# Patient Record
Sex: MALE | Race: WHITE | Employment: FULL TIME | URBAN - METROPOLITAN AREA
[De-identification: names, ages, dates, MRNs, and addresses within clinical notes are randomized per-mention and may not be internally consistent; named-entity substitution may affect disease eponyms.]

---

## 2018-05-10 ENCOUNTER — OFFICE VISIT (OUTPATIENT)
Dept: FAMILY MEDICINE CLINIC | Age: 43
End: 2018-05-10

## 2018-05-10 VITALS
WEIGHT: 160.2 LBS | SYSTOLIC BLOOD PRESSURE: 139 MMHG | BODY MASS INDEX: 22.94 KG/M2 | DIASTOLIC BLOOD PRESSURE: 93 MMHG | TEMPERATURE: 96.9 F | HEART RATE: 80 BPM | OXYGEN SATURATION: 100 % | RESPIRATION RATE: 18 BRPM | HEIGHT: 70 IN

## 2018-05-10 DIAGNOSIS — Z00.00 ROUTINE GENERAL MEDICAL EXAMINATION AT A HEALTH CARE FACILITY: Primary | ICD-10-CM

## 2018-05-10 DIAGNOSIS — Z12.5 PROSTATE CANCER SCREENING: ICD-10-CM

## 2018-05-10 DIAGNOSIS — Z13.220 SCREENING, LIPID: ICD-10-CM

## 2018-05-10 DIAGNOSIS — Z80.42 FAMILY HISTORY OF PROSTATE CANCER IN FATHER: ICD-10-CM

## 2018-05-10 NOTE — MR AVS SNAPSHOT
[address] [facility] 
[phone] Patient: Josias Hernandez MRN: [id number] AWR:[id number] Visit Information Date & Time Provider Department Dept. Phone Encounter #  
 5/10/2018 10:00 AM Joselito Starkey  [facility] [phone] [id number] Follow-up Instructions Return in about 1 year (around 5/10/2019) for Complete Physical.  
  
Upcoming Health Maintenance Date Due Pneumococcal 19-64 Medium Risk (1 of 1 - PPSV23) 9/19/1994 Influenza Age 5 to Adult 8/1/2018 DTaP/Tdap/Td series (2 - Td) 3/31/2019 Allergies as of 5/10/2018  Review Complete On: 5/10/2018 By: Bean Vitale LPN Severity Noted Reaction Type Reactions Nuts [Tree Nut]  03/18/2014    Swelling Other Medication  09/20/2010    Other (comments) Cashews and banana   Tongue tingles Current Immunizations  Reviewed on 3/13/2012 Name Date TDAP Vaccine 3/31/2009 Not reviewed this visit You Were Diagnosed With   
  
 Codes Comments Routine general medical examination at a health care facility    -  Primary ICD-10-CM: Z00.00 ICD-9-CM: V70.0 Screening, lipid     ICD-10-CM: U46.531 ICD-9-CM: V77.91 Prostate cancer screening     ICD-10-CM: Z12.5 ICD-9-CM: V76.44 Family history of prostate cancer in father     ICD-10-CM: Z80.41 
ICD-9-CM: V16.42 Vitals BP Pulse Temp Resp Height(growth percentile) Weight(growth percentile) (!) 139/93 (BP 1 Location: Left arm, BP Patient Position: Sitting) 80 96.9 °F (36.1 °C) (Oral) 18 5' 10\" (1.778 m) 160 lb 3.2 oz (72.7 kg) SpO2 BMI Smoking Status 100% 22.99 kg/m2 Never Smoker Vitals History BMI and BSA Data Body Mass Index Body Surface Area  
 22.99 kg/m 2 1.89 m 2 Preferred Pharmacy Pharmacy Name Phone  [id number] [phone] Your Updated Medication List  
  
Notice  As of 5/10/2018 10:33 AM  
 You have not been prescribed any medications. Follow-up Instructions Return in about 1 year (around 5/10/2019) for Complete Physical.  
  
  
Patient Instructions Well Visit, Ages 25 to 48: Care Instructions Your Care Instructions Physical exams can help you stay healthy. Your doctor has checked your overall health and may have suggested ways to take good care of yourself. He or she also may have recommended tests. At home, you can help prevent illness with healthy eating, regular exercise, and other steps. Follow-up care is a key part of your treatment and safety. Be sure to make and go to all appointments, and call your doctor if you are having problems. It's also a good idea to know your test results and keep a list of the medicines you take. How can you care for yourself at home? · Reach and stay at a healthy weight. This will lower your risk for many problems, such as obesity, diabetes, heart disease, and high blood pressure. · Get at least 30 minutes of physical activity on most days of the week. Walking is a good choice. You also may want to do other activities, such as running, swimming, cycling, or playing tennis or team sports. Discuss any changes in your exercise program with your doctor. · Do not smoke or allow others to smoke around you. If you need help quitting, talk to your doctor about stop-smoking programs and medicines. These can increase your chances of quitting for good. · Talk to your doctor about whether you have any risk factors for sexually transmitted infections (STIs). Having one sex partner (who does not have STIs and does not have sex with anyone else) is a good way to avoid these infections. · Use birth control if you do not want to have children at this time. Talk with your doctor about the choices available and what might be best for you. · Protect your skin from too much sun. When you're outdoors from 10 a.m. to 4 p.m., stay in the shade or cover up with clothing and a hat with a wide brim. Wear sunglasses that block UV rays. Even when it's cloudy, put broad-spectrum sunscreen (SPF 30 or higher) on any exposed skin. · See a dentist one or two times a year for checkups and to have your teeth cleaned. · Wear a seat belt in the car. · Drink alcohol in moderation, if at all. That means no more than 2 drinks a day for men and 1 drink a day for women. Follow your doctor's advice about when to have certain tests. These tests can spot problems early. For everyone · Cholesterol. Have the fat (cholesterol) in your blood tested after age 21. Your doctor will tell you how often to have this done based on your age, family history, or other things that can increase your risk for heart disease. · Blood pressure. Have your blood pressure checked during a routine doctor visit. Your doctor will tell you how often to check your blood pressure based on your age, your blood pressure results, and other factors. · Vision. Talk with your doctor about how often to have a glaucoma test. 
· Diabetes. Ask your doctor whether you should have tests for diabetes. · Colon cancer. Have a test for colon cancer at age 48. You may have one of several tests. If you are younger than 48, you may need a test earlier if you have any risk factors. Risk factors include whether you already had a precancerous polyp removed from your colon or whether your parent, brother, sister, or child has had colon cancer. For women · Breast exam and mammogram. Talk to your doctor about when you should have a clinical breast exam and a mammogram. Medical experts differ on whether and how often women under 50 should have these tests. Your doctor can help you decide what is right for you. · Pap test and pelvic exam. Begin Pap tests at age 24.  A Pap test is the best way to find cervical cancer. The test often is part of a pelvic exam. Ask how often to have this test. 
· Tests for sexually transmitted infections (STIs). Ask whether you should have tests for STIs. You may be at risk if you have sex with more than one person, especially if your partners do not wear condoms. For men · Tests for sexually transmitted infections (STIs). Ask whether you should have tests for STIs. You may be at risk if you have sex with more than one person, especially if you do not wear a condom. · Testicular cancer exam. Ask your doctor whether you should check your testicles regularly. · Prostate exam. Talk to your doctor about whether you should have a blood test (called a PSA test) for prostate cancer. Experts differ on whether and when men should have this test. Some experts suggest it if you are older than 39 and are -American or have a father or brother who got prostate cancer when he was younger than 72. When should you call for help? Watch closely for changes in your health, and be sure to contact your doctor if you have any problems or symptoms that concern you. Where can you learn more? Go to http://nirmal-flora.info/. Enter P072 in the search box to learn more about \"Well Visit, Ages 25 to 48: Care Instructions. \" Current as of: May 12, 2017 Content Version: 11.4 © 5156-6661 Healthwise[employer]orporated. Care instructions adapted under license by [employer] (which disclaims liability or warranty for this information). If you have questions about a medical condition or this instruction, always ask your healthcare professional. [address] any warranty or liability for your use of this information. Introducing [facility]! Leena Newsome introduces [employer] patient portal. Now you can access parts of your medical record, email your doctor's office, and request medication refills online. 1. In your internet browser, go to [email or web address] 2. Click on the First Time User? Click Here link in the Sign In box. You will see the New Member Sign Up page. 3. Enter your Viepage Access Code exactly as it appears below. You will not need to use this code after youve completed the sign-up process. If you do not sign up before the expiration date, you must request a new code. · Viepage Access Code: [email or web address] Expires: 8/8/2018  9:26 AM 
 
4. Enter the last four digits of your Social Security Number (xxxx) and Date of Birth (mm/dd/yyyy) as indicated and click Submit. You will be taken to the next sign-up page. 5. Create a frestylt ID. This will be your Viepage login ID and cannot be changed, so think of one that is secure and easy to remember. 6. Create a Viepage password. You can change your password at any time. 7. Enter your Password Reset Question and Answer. This can be used at a later time if you forget your password. 8. Enter your e-mail address. You will receive e-mail notification when new information is available in [address] 9. Click Sign Up. You can now view and download portions of your medical record. 10. Click the Download Summary menu link to download a portable copy of your medical information. If you have questions, please visit the Frequently Asked Questions section of the Viepage website. Remember, Viepage is NOT to be used for urgent needs. For medical emergencies, dial 911. Now available from your iPhone and Android! Please provide this summary of care documentation to your next provider. Your primary care clinician is listed as Kaylene Koyanagi. If you have any questions after today's visit, please call [phone].

## 2018-05-10 NOTE — PROGRESS NOTES
Assessment/Plan:     Diagnoses and all orders for this visit:    1. Routine general medical examination at a health care facility  -     CBC WITH AUTOMATED DIFF  -     METABOLIC PANEL, COMPREHENSIVE  He is up-to-date on routine care. Follow-up in 1 year sooner as needed. Fasting lipids for baseline. 2. Screening, lipid  -     LIPID PANEL    3. Prostate cancer screening  -     PSA W/ REFLX FREE PSA  If normal repeat at age 39. Patient requested baseline PSA at this time. 4. Family history of prostate cancer in father  -     PSA W/ REFLX FREE PSA               Follow-up Disposition:  Return in about 1 year (around 5/10/2019) for Complete Physical.    Discussed expected course/resolution/complications of diagnosis in detail with patient.    Medication risks/benefits/costs/interactions/alternatives discussed with patient.    Pt was given after visit summary which includes diagnoses, current medications & vitals. Pt expressed understanding with the diagnosis and plan    HPI:   Vinicio Andersen is a 43 y.o. male who presents for annual exam and to establish care. Reports he is generally in good health. Is without concerns today. There is a family history of prostate cancer which he would like evaluated at this time. Denies nocturia. Is generally active in the form of aerobic activity. Reports diet is generally healthy.        Allergies   Allergen Reactions    Nuts [Tree Nut] Swelling    Other Medication Other (comments)     Cashews and banana   Tongue tingles      Patient Active Problem List   Diagnosis Code    Asthma, exercise induced J45.990    Allergic rhinitis J30.9    Family history of prostate cancer in father Z80.41     Past Medical History:   Diagnosis Date    Acne vulgaris     acne treated with Accutane    Asthma     exercise induced    Hemorrhoids     Nosebleed     frequent      Past Surgical History:   Procedure Laterality Date    HX KNEE ARTHROSCOPY  12/11    right partial menisctomy, cyclops lesion    HX KNEE ARTHROSCOPY  7/11    right ACL, meniscus repair      No LMP for male patient. 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     Social History Main Topics    Smoking status: Never Smoker    Smokeless tobacco: Never Used    Alcohol use 4.0 oz/week     8 Cans of beer per week    Drug use: No    Sexual activity: Yes     Partners: Female     Birth control/ protection: None     Other Topics Concern    Exercise Not Asked      golfs once a week, weights 3 times per week. Social History Narrative        ROS:     Review of Systems   Constitutional: Negative for fever, malaise/fatigue and weight loss. HENT: Negative for hearing loss. Eyes: Negative for blurred vision and pain. Respiratory: Negative for cough and shortness of breath. Cardiovascular: Negative for chest pain, palpitations and leg swelling. Gastrointestinal: Negative for abdominal pain, blood in stool, constipation, diarrhea and melena. Genitourinary: Negative for dysuria and hematuria. Musculoskeletal: Negative for joint pain. Skin: Negative for rash. Neurological: Negative for headaches. Psychiatric/Behavioral: Negative for depression. The patient is not nervous/anxious and does not have insomnia.         Physical Exam:     Visit Vitals    BP (!) 139/93 (BP 1 Location: Left arm, BP Patient Position: Sitting)    Pulse 80    Temp 96.9 °F (36.1 °C) (Oral)    Resp 18    Ht 5' 10\" (1.778 m)    Wt 160 lb 3.2 oz (72.7 kg)    SpO2 100%    BMI 22.99 kg/m2        Vital signs and nurse documentation reviewed. Physical Exam   Constitutional: No distress. HENT:   Right Ear: No drainage. Tympanic membrane is not injected, not erythematous and not bulging. No middle ear effusion. Left Ear: No drainage. Tympanic membrane is not injected, not erythematous and not bulging. No middle ear effusion. Nose: No mucosal edema or rhinorrhea. Mouth/Throat: Normal dentition. No oropharyngeal exudate, posterior oropharyngeal erythema or tonsillar abscesses. Eyes: Pupils are equal, round, and reactive to light. Neck: No JVD present. Carotid bruit is not present. No tracheal deviation present. No thyroid mass and no thyromegaly present. Cardiovascular: S1 normal and S2 normal.  Exam reveals no gallop and no friction rub. No murmur heard. Pulses:       Dorsalis pedis pulses are 2+ on the right side, and 2+ on the left side. Posterior tibial pulses are 2+ on the right side, and 2+ on the left side. Pulmonary/Chest: Breath sounds normal. He has no wheezes. Abdominal: Soft. Bowel sounds are normal. He exhibits no distension and no mass. There is no hepatosplenomegaly. There is no tenderness. Lymphadenopathy:     He has no cervical adenopathy. He has no axillary adenopathy. Neurological: He has normal motor skills, normal sensation and normal strength. No cranial nerve deficit. Gait normal.   Skin: Skin is warm and dry.    Psychiatric: Mood, memory, affect and judgment normal.

## 2018-05-10 NOTE — PATIENT INSTRUCTIONS

## 2018-05-10 NOTE — PROGRESS NOTES
Chief Complaint   Patient presents with   [facility].    Complete Physical     1. Have you been to the ER, urgent care clinic since your last visit? Hospitalized since your last visit? No    2. Have you seen or consulted any other health care providers outside of the [address] [place] [address] since your last visit? Include any pap smears or colon screening.  No

## 2018-07-19 ENCOUNTER — HOSPITAL ENCOUNTER (OUTPATIENT)
Dept: ULTRASOUND IMAGING | Age: 43
Discharge: HOME | End: 2018-07-19
Attending: PODIATRIST
Payer: COMMERCIAL

## 2018-07-19 DIAGNOSIS — S86.019A ACHILLES TENDON TEAR: ICD-10-CM

## 2018-07-19 PROCEDURE — 76882 US LMTD JT/FCL EVL NVASC XTR: CPT

## 2018-11-30 ENCOUNTER — OFFICE VISIT (OUTPATIENT)
Dept: FAMILY MEDICINE CLINIC | Age: 43
End: 2018-11-30

## 2018-11-30 VITALS
BODY MASS INDEX: 23.91 KG/M2 | OXYGEN SATURATION: 99 % | WEIGHT: 167 LBS | HEART RATE: 86 BPM | HEIGHT: 70 IN | DIASTOLIC BLOOD PRESSURE: 80 MMHG | RESPIRATION RATE: 16 BRPM | TEMPERATURE: 97.2 F | SYSTOLIC BLOOD PRESSURE: 116 MMHG

## 2018-11-30 DIAGNOSIS — J06.9 VIRAL URI WITH COUGH: Primary | ICD-10-CM

## 2018-11-30 NOTE — PROGRESS NOTES
[facility]  Clinic Note  Subjective:      Nino Yung is a 37 y.o. male who presents for an acute visit with the following chief complaints. Chief Complaint   Patient presents with    Nasal Congestion     sinus congestion for 2 days. taking Claritin D and Advil Sinus. pt states he has had his flu shot.  Cough     dry cough    Headache     Upper Respiratory Infection  Patient complains of symptoms of a URI. Symptoms include congestion and cough. Onset of symptoms was 2 days ago, stable since that time. No fever, initially he had body aches, chills, headache, this has all resolved and now he has nasal congestion, non-productive cough, low energy. No chest pain or SOB. He is drinking moderate amounts of fluids. . Evaluation to date: none. Treatment to date: OTC products. Taking Claritin- D, Advil sinus and cold which was helpful. He has presentation in 4 days       Allergies   Allergen Reactions    Nuts [Tree Nut] Swelling    Other Medication Other (comments)     Cashews and banana   Tongue tingles       ROS:   Complete review of systems was reviewed with pertinent information listed in HPI. Review of Systems   Constitutional: Positive for malaise/fatigue. Negative for chills, diaphoresis, fever and weight loss. HENT: Positive for congestion. Negative for ear pain, hearing loss, sinus pain, sore throat and tinnitus. Eyes: Negative for blurred vision. Respiratory: Positive for cough. Negative for sputum production, shortness of breath and wheezing. Cardiovascular: Negative for chest pain and palpitations. Gastrointestinal: Negative for abdominal pain, diarrhea, heartburn, nausea and vomiting. Genitourinary: Negative for dysuria. Musculoskeletal: Negative for myalgias. Skin: Negative for rash. Neurological: Negative for dizziness, weakness and headaches. Psychiatric/Behavioral: The patient does not have insomnia.         Objective:     Visit Vitals  BP 116/80 (BP 1 Location: Left arm, BP Patient Position: Sitting)   Pulse 86   Temp 97.2 °F (36.2 °C) (Oral)   Resp 16   Ht 5' 10\" (1.778 m)   Wt 167 lb (75.8 kg)   SpO2 99%   BMI 23.96 kg/m²       Vitals and Nurse Documentation reviewed. Physical Exam   Constitutional: He is oriented to person, place, and time and well-developed, well-nourished, and in no distress. He does not have a sickly appearance. HENT:   Head: Normocephalic and atraumatic. Right Ear: Hearing, external ear and ear canal normal. Tympanic membrane is not erythematous and not bulging. No middle ear effusion. Left Ear: Hearing, external ear and ear canal normal. Tympanic membrane is not erythematous and not bulging. No middle ear effusion. Nose: Mucosal edema and rhinorrhea present. Right sinus exhibits no maxillary sinus tenderness and no frontal sinus tenderness. Left sinus exhibits no maxillary sinus tenderness and no frontal sinus tenderness. Mouth/Throat: Uvula is midline, oropharynx is clear and moist and mucous membranes are normal. Mucous membranes are not pale, not dry and not cyanotic. No oropharyngeal exudate, posterior oropharyngeal edema, posterior oropharyngeal erythema or tonsillar abscesses. Eyes: Conjunctivae are normal. Pupils are equal, round, and reactive to light. Right conjunctiva is not injected. Left conjunctiva is not injected. Neck: Normal range of motion and phonation normal. Neck supple. No tracheal deviation present. No thyromegaly present. Cardiovascular: Normal rate, regular rhythm, S1 normal, S2 normal, normal heart sounds and intact distal pulses. Exam reveals no gallop and no friction rub. No murmur heard. Pulmonary/Chest: Effort normal and breath sounds normal. No respiratory distress. He has no decreased breath sounds. He has no wheezes. He has no rhonchi. He has no rales. He exhibits no tenderness. Musculoskeletal: He exhibits no edema. Lymphadenopathy:        Head (right side):  No submental, no submandibular, no tonsillar, no preauricular, no posterior auricular and no occipital adenopathy present. Head (left side): No submental, no submandibular, no tonsillar, no preauricular, no posterior auricular and no occipital adenopathy present. He has no cervical adenopathy. Neurological: He is alert and oriented to person, place, and time. Gait normal.   Skin: Skin is warm, dry and intact. No rash noted. He is not diaphoretic. No cyanosis. No pallor. Nails show no clubbing. Psychiatric: Mood and affect normal.   Nursing note and vitals reviewed. Assessment/Plan:     Diagnoses and all orders for this visit:    1. Viral URI with cough: Day 3 of URI symptoms without signs or risk factors for bacterial infections. Reassurance provided. Continue conservative therapy, RTC if symptoms worsen or do not resolve in 3-4 days. Follow-up Disposition:  Return if symptoms worsen or fail to improve.     Discussed expected course/resolution/complications of diagnosis in detail with patient.    Medication risks/benefits/costs/interactions/alternatives discussed with patient.    Pt was given an after visit summary which includes diagnoses, current medications & vitals.  Pt expressed understanding with the diagnosis and plan

## 2018-11-30 NOTE — PATIENT INSTRUCTIONS
Dextromethorphan - robitussin - cough   Guaifenesin - Mucinex - expectorant/congestion  Advil or tylenol for fever, pain. Increase hydration  Humidification at night can be helpful. Nasal saline rinses are helpful for congestion and post nasal drip. Return or call if symptoms do not improve in 3-4 days        Upper Respiratory Infection (Cold): Care Instructions  Your Care Instructions    An upper respiratory infection, or URI, is an infection of the nose, sinuses, or throat. URIs are spread by coughs, sneezes, and direct contact. The common cold is the most frequent kind of URI. The flu and sinus infections are other kinds of URIs. Almost all URIs are caused by viruses. Antibiotics won't cure them. But you can treat most infections with home care. This may include drinking lots of fluids and taking over-the-counter pain medicine. You will probably feel better in 4 to 10 days. The doctor has checked you carefully, but problems can develop later. If you notice any problems or new symptoms, get medical treatment right away. Follow-up care is a key part of your treatment and safety. Be sure to make and go to all appointments, and call your doctor if you are having problems. It's also a good idea to know your test results and keep a list of the medicines you take. How can you care for yourself at home? · To prevent dehydration, drink plenty of fluids, enough so that your urine is light yellow or clear like water. Choose water and other caffeine-free clear liquids until you feel better. If you have kidney, heart, or liver disease and have to limit fluids, talk with your doctor before you increase the amount of fluids you drink. · Take an over-the-counter pain medicine, such as acetaminophen (Tylenol), ibuprofen (Advil, Motrin), or naproxen (Aleve). Read and follow all instructions on the label. · Before you use cough and cold medicines, check the label.  These medicines may not be safe for young children or for people with certain health problems. · Be careful when taking over-the-counter cold or flu medicines and Tylenol at the same time. Many of these medicines have acetaminophen, which is Tylenol. Read the labels to make sure that you are not taking more than the recommended dose. Too much acetaminophen (Tylenol) can be harmful. · Get plenty of rest.  · Do not smoke or allow others to smoke around you. If you need help quitting, talk to your doctor about stop-smoking programs and medicines. These can increase your chances of quitting for good. When should you call for help? Call 911 anytime you think you may need emergency care. For example, call if:    · You have severe trouble breathing.    Call your doctor now or seek immediate medical care if:    · You seem to be getting much sicker.     · You have new or worse trouble breathing.     · You have a new or higher fever.     · You have a new rash.    Watch closely for changes in your health, and be sure to contact your doctor if:    · You have a new symptom, such as a sore throat, an earache, or sinus pain.     · You cough more deeply or more often, especially if you notice more mucus or a change in the color of your mucus.     · You do not get better as expected. Where can you learn more? Go to http://nirmal-flora.info/. Enter C740 in the search box to learn more about \"Upper Respiratory Infection (Cold): Care Instructions. \"  Current as of: December 6, 2017  Content Version: 11.8  © 1566-3350 [employer]. Care instructions adapted under license by [employer] (which disclaims liability or warranty for this information). If you have questions about a medical condition or this instruction, always ask your healthcare professional. [place] [address] any warranty or liability for your use of this information.

## 2018-11-30 NOTE — PROGRESS NOTES
Chief Complaint   Patient presents with    Nasal Congestion     sinus congestion for 2 days. taking Claritin D and Advil Sinus. pt states he has had his flu shot.  Cough     dry cough    Headache     \"REVIEWED RECORD IN PREPARATION FOR VISIT AND HAVE OBTAINED THE NECESSARY DOCUMENTATION\"  1. Have you been to the ER, urgent care clinic since your last visit? Hospitalized since your last visit? No    2. Have you seen or consulted any other health care providers outside of the [address] [place] [address] since your last visit? Include any pap smears or colon screening.  No

## 2019-02-21 ENCOUNTER — OFFICE VISIT (OUTPATIENT)
Dept: FAMILY MEDICINE CLINIC | Age: 44
End: 2019-02-21

## 2019-02-21 VITALS
BODY MASS INDEX: 23.82 KG/M2 | DIASTOLIC BLOOD PRESSURE: 82 MMHG | WEIGHT: 166.4 LBS | HEART RATE: 72 BPM | SYSTOLIC BLOOD PRESSURE: 120 MMHG | HEIGHT: 70 IN | TEMPERATURE: 98.1 F | RESPIRATION RATE: 16 BRPM | OXYGEN SATURATION: 97 %

## 2019-02-21 DIAGNOSIS — R68.89 FLU-LIKE SYMPTOMS: ICD-10-CM

## 2019-02-21 DIAGNOSIS — J06.9 VIRAL URI WITH COUGH: Primary | ICD-10-CM

## 2019-02-21 LAB
QUICKVUE INFLUENZA TEST: NEGATIVE
VALID INTERNAL CONTROL?: YES

## 2019-02-21 NOTE — PROGRESS NOTES
HISTORY OF PRESENT ILLNESS  Wiliam Acosta is a 37 y.o. male. HPI  C/o cough, chest congestion and chills x 4 days. Low grade fever to 100.2 initially, now resolved. Taking robitussin and ibuprofen with some temporary sx relief. Past medical history, social history, family history and medications were reviewed and updated. Blood pressure 120/82, pulse 72, temperature 98.1 °F (36.7 °C), temperature source Oral, resp. rate 16, height 5' 10\" (1.778 m), weight 166 lb 6.4 oz (75.5 kg), SpO2 97 %. Review of Systems   Constitutional: Positive for chills, fever and malaise/fatigue. HENT: Negative for sinus pain and sore throat. Respiratory: Positive for cough and sputum production (yellow/green). Negative for shortness of breath and wheezing. Cardiovascular: Negative for chest pain. Neurological: Negative for headaches. All other systems reviewed and are negative. Physical Exam   Constitutional: He appears well-developed and well-nourished. No distress. HENT:   Right Ear: Tympanic membrane and ear canal normal.   Left Ear: Tympanic membrane and ear canal normal.   Nose: Right sinus exhibits no maxillary sinus tenderness and no frontal sinus tenderness. Left sinus exhibits no maxillary sinus tenderness and no frontal sinus tenderness. Mouth/Throat: Posterior oropharyngeal erythema present. No oropharyngeal exudate or posterior oropharyngeal edema. Neck: Neck supple. Cardiovascular: Normal rate, regular rhythm and normal heart sounds. Pulmonary/Chest: Effort normal and breath sounds normal. He has no wheezes. Lymphadenopathy:     He has no cervical adenopathy. Skin: Skin is warm and dry. ASSESSMENT and PLAN  Diagnoses and all orders for this visit:    1. Viral URI with cough  Rest and fluids. Mucinex  DM bid. Nyquil cough at bedtime prn.    2. Flu-like symptoms  -     AMB POC RAPID INFLUENZA TEST  Negative. Follow up prn if sx worsen or FTI.

## 2019-02-21 NOTE — PROGRESS NOTES
Chief Complaint   Patient presents with    Cold Symptoms     Started coughing about 4 days, dark greenish mucous,fever , wheezing and chest congestion,  took some advil and claritin , not helped much. 1. Have you been to the ER, urgent care clinic since your last visit? Hospitalized since your last visit? No    2. Have you seen or consulted any other health care providers outside of the [address] [place] [address] since your last visit? Include any pap smears or colon screening.  No.

## 2019-06-12 ENCOUNTER — OFFICE VISIT (OUTPATIENT)
Dept: FAMILY MEDICINE CLINIC | Age: 44
End: 2019-06-12

## 2019-06-12 VITALS
DIASTOLIC BLOOD PRESSURE: 84 MMHG | OXYGEN SATURATION: 98 % | WEIGHT: 164 LBS | SYSTOLIC BLOOD PRESSURE: 142 MMHG | HEART RATE: 71 BPM | RESPIRATION RATE: 20 BRPM | TEMPERATURE: 97.9 F | HEIGHT: 70 IN | BODY MASS INDEX: 23.48 KG/M2

## 2019-06-12 DIAGNOSIS — J02.9 SORE THROAT: ICD-10-CM

## 2019-06-12 DIAGNOSIS — J02.9 PHARYNGITIS, UNSPECIFIED ETIOLOGY: Primary | ICD-10-CM

## 2019-06-12 LAB
S PYO AG THROAT QL: NEGATIVE
VALID INTERNAL CONTROL?: YES

## 2019-06-12 RX ORDER — AMOXICILLIN 875 MG/1
875 TABLET, FILM COATED ORAL 2 TIMES DAILY
Qty: 20 TAB | Refills: 0 | Status: SHIPPED | OUTPATIENT
Start: 2019-06-12 | End: 2019-06-22

## 2019-06-12 NOTE — PROGRESS NOTES
HPI  Jasper Hi 37 y.o. male  presents to the office today for sore throat. Blood pressure 142/84, pulse 71, temperature 97.9 °F (36.6 °C), temperature source Oral, resp. rate 20, height 5' 10\" (1.778 m), weight 164 lb (74.4 kg), SpO2 98 %. Body mass index is 23.53 kg/m². Chief Complaint   Patient presents with    Sore Throat     sore throat, body aches, dry cough, started this today per patient his daughter was sick and was given medication but doesnt know what they gave her      Sore throat/Pharyngitis: Pt reports that his daughter was sick yesterday and was diagnosed with sinal infection and was prescribed antibiotics. This morning, the pt started experiencing body aches and at 2pm, Pt started experienced chills, dry cough and sore throat. Strep test today in office was negative, but I advised to pt that strep test is usually not 100% accurate. Upon PE, I diagnosed pt with pharyngitis and I will provide pt with Amoxicillin 875 mg/BID. I also provided pt with a list of OTC medications that can be helpful to relieve sx. If sx do not improve or worsen, I advised pt to return to office.      Allergies   Allergen Reactions    Nuts [Tree Nut] Swelling    Other Medication Other (comments)     Cashews and banana   Tongue tingles     Past Medical History:   Diagnosis Date    Acne vulgaris     acne treated with Accutane    Asthma     exercise induced    Hemorrhoids     Nosebleed     frequent     Past Surgical History:   Procedure Laterality Date    HX KNEE ARTHROSCOPY  12/11    right partial menisctomy, cyclops lesion    HX KNEE ARTHROSCOPY  7/11    right ACL, meniscus repair     Family History   Problem Relation Age of Onset    Hypertension Mother     Cancer Father 61        prostate, mets to spine    Cancer Paternal Aunt         breast CA 45s    Diabetes Paternal Grandfather     Hypertension Brother 43    Thyroid Disease Brother 36     Social History     Tobacco Use    Smoking status: Never Smoker    Smokeless tobacco: Never Used   Substance Use Topics    Alcohol use: Yes     Alcohol/week: 4.0 oz     Types: 8 Cans of beer per week        Review of Systems   Constitutional: Negative for chills and fever. HENT: Positive for sinus pain and sore throat. Negative for hearing loss and tinnitus. Eyes: Negative for blurred vision and double vision. Respiratory: Negative for cough and shortness of breath. Cardiovascular: Negative for chest pain and palpitations. Gastrointestinal: Negative for nausea and vomiting. Genitourinary: Negative for dysuria and frequency. Musculoskeletal: Negative for back pain and falls. Skin: Negative for itching and rash. Neurological: Negative for dizziness, loss of consciousness and headaches. Psychiatric/Behavioral: Negative for depression. The patient is not nervous/anxious. Physical Exam   Constitutional: He is oriented to person, place, and time. He appears well-developed and well-nourished. HENT:   Head: Normocephalic and atraumatic. Right Ear: External ear normal.   Left Ear: External ear normal.   Nose: Nose normal.   Mouth/Throat: Oropharynx is clear and moist.   Erythema in back of throat, no tonsillar exudate noted    Eyes: Conjunctivae and EOM are normal.   Neck: Normal range of motion. Neck supple. Notable cervical adenopathy   Cardiovascular: Normal rate, regular rhythm, normal heart sounds and intact distal pulses. Pulmonary/Chest: Effort normal and breath sounds normal.   Abdominal: Soft. Bowel sounds are normal.   Musculoskeletal: Normal range of motion. Neurological: He is alert and oriented to person, place, and time. Skin: Skin is warm and dry. Psychiatric: He has a normal mood and affect. His behavior is normal. Judgment and thought content normal.   Nursing note and vitals reviewed. ASSESSMENT and PLAN  Diagnoses and all orders for this visit:    1.  Pharyngitis, unspecified etiology  Provided pt with prescription for Amoxicillin 875 mg/BID for 10 days. Provided pt with a list of OTC medications to help relieving sx. If sx do not improve or worsen, advised pt to return to office.   -     amoxicillin (AMOXIL) 875 mg tablet; Take 1 Tab by mouth two (2) times a day for 10 days. 2. Sore throat  Strep test negative. -     AMB POC RAPID STREP A    Follow-up and Dispositions    · Return if symptoms worsen or fail to improve, for uri. Medication risks/benefits/costs/interactions/alternatives discussed with patient. Advised patient to call back or return to office if symptoms worsen/change/persist.  If patient cannot reach us or should anything more severe/urgent arise he/she should proceed directly to the nearest emergency department. Discussed expected course/resolution/complications of diagnosis in detail with patient. Patient given a written after visit summary which includes her diagnoses, current medications and vitals. Patient expressed understanding with the diagnosis and plan. Written by casper Nguyen, as dictated by Johanna Marks M.D.    6:15 PM - 6:23 PM    Total time spent with the patient 7 minutes, greater than 50% of time spent counseling patient.

## 2019-06-12 NOTE — PATIENT INSTRUCTIONS

## 2019-06-12 NOTE — PROGRESS NOTES
Chief Complaint   Patient presents with    Sore Throat     sore throat, body aches, dry cough, started this today per patient his daughter was sick and was given medication but doesnt know what they gave her         Spoke to patient Identified pt with two pt identifiers (Name @ )    1. Have you been to the ER, urgent care clinic since your last visit? Hospitalized since your last visit? NO    2. Have you seen or consulted any other health care providers outside of the [address] [place] [address] since your last visit? Include any pap smears or colon screening.      \"REVIEWED RECORD IN PREPARATION FOR VISIT AND HAVE OBTAINED NECESSARY DOCUMENTATION\"

## 2019-06-24 ENCOUNTER — OFFICE VISIT (OUTPATIENT)
Dept: FAMILY MEDICINE CLINIC | Age: 44
End: 2019-06-24

## 2019-06-24 VITALS
BODY MASS INDEX: 23.34 KG/M2 | HEIGHT: 70 IN | HEART RATE: 72 BPM | OXYGEN SATURATION: 98 % | RESPIRATION RATE: 18 BRPM | TEMPERATURE: 98.2 F | DIASTOLIC BLOOD PRESSURE: 75 MMHG | WEIGHT: 163 LBS | SYSTOLIC BLOOD PRESSURE: 118 MMHG

## 2019-06-24 DIAGNOSIS — J06.9 ACUTE URI: ICD-10-CM

## 2019-06-24 DIAGNOSIS — M25.50 POLYARTHRALGIA: ICD-10-CM

## 2019-06-24 DIAGNOSIS — R53.83 FATIGUE, UNSPECIFIED TYPE: Primary | ICD-10-CM

## 2019-06-24 RX ORDER — BENZONATATE 200 MG/1
200 CAPSULE ORAL
Qty: 20 CAP | Refills: 0 | Status: SHIPPED | OUTPATIENT
Start: 2019-06-24 | End: 2019-07-01

## 2019-06-24 RX ORDER — IBUPROFEN 200 MG
TABLET ORAL
COMMUNITY

## 2019-06-24 NOTE — PATIENT INSTRUCTIONS
Fatigue: Care Instructions  Your Care Instructions    Fatigue is a feeling of tiredness, exhaustion, or lack of energy. You may feel fatigue because of too much or not enough activity. It can also come from stress, lack of sleep, boredom, and poor diet. Many medical problems, such as viral infections, can cause fatigue. Emotional problems, especially depression, are often the cause of fatigue. Fatigue is most often a symptom of another problem. Treatment for fatigue depends on the cause. For example, if you have fatigue because you have a certain health problem, treating this problem also treats your fatigue. If depression or anxiety is the cause, treatment may help. Follow-up care is a key part of your treatment and safety. Be sure to make and go to all appointments, and call your doctor if you are having problems. It's also a good idea to know your test results and keep a list of the medicines you take. How can you care for yourself at home? · Get regular exercise. But don't overdo it. Go back and forth between rest and exercise. · Get plenty of rest.  · Eat a healthy diet. Do not skip meals, especially breakfast.  · Reduce your use of caffeine, tobacco, and alcohol. Caffeine is most often found in coffee, tea, cola drinks, and chocolate. · Limit medicines that can cause fatigue. This includes tranquilizers and cold and allergy medicines. When should you call for help? Watch closely for changes in your health, and be sure to contact your doctor if:    · You have new symptoms such as fever or a rash.     · Your fatigue gets worse.     · You have been feeling down, depressed, or hopeless. Or you may have lost interest in things that you usually enjoy.     · You are not getting better as expected. Where can you learn more? Go to http://nirmal-flora.info/. Enter E324 in the search box to learn more about \"Fatigue: Care Instructions. \"  Current as of: September 23, 2018  Content Version: 11.9  © 8880-1257 [employer]. Care instructions adapted under license by [employer] (which disclaims liability or warranty for this information). If you have questions about a medical condition or this instruction, always ask your healthcare professional. [address] any warranty or liability for your use of this information.

## 2019-06-24 NOTE — PROGRESS NOTES
Assessment/Plan:     Diagnoses and all orders for this visit:    1. Fatigue, unspecified type  -     CBC WITH AUTOMATED DIFF  -     METABOLIC PANEL, COMPREHENSIVE  -     XR CHEST PA LAT; Future  -     GIDEON BARR VIRUS AB PANELq  Labs pending. Etiology possibly viral.      2. Acute URI  -     CBC WITH AUTOMATED DIFF  -     METABOLIC PANEL, COMPREHENSIVE  -     XR CHEST PA LAT; Future  -     GIDEON BARR VIRUS AB PANEL  -     benzonatate (TESSALON) 200 mg capsule; Take 1 Cap by mouth three (3) times daily as needed for Cough for up to 7 days. Treatment as above. Await labs and xray. Follow up if no improvement. 3. Polyarthralgia  -     CBC WITH AUTOMATED DIFF  -     METABOLIC PANEL, COMPREHENSIVE  -     XR CHEST PA LAT; Future  -     GIDEON BARR VIRUS AB PANEL        Follow-up and Dispositions    · Return if symptoms worsen or fail to improve. Discussed expected course/resolution/complications of diagnosis in detail with patient. Medication risks/benefits/costs/interactions/alternatives discussed with patient. Pt was given after visit summary which includes diagnoses, current medications & vitals. Pt expressed understanding with the diagnosis and plan          Subjective:      Souleymane Araujo is a 37 y.o. male who presents for had concerns including Cold Symptoms (cough at night x 12 days); Lethargy; and Joint Pain. Upper Respiratory Infection  Patient complains of symptoms of a URI. Symptoms include cough. Onset of symptoms was 12 days ago, unchanged since that time. He also c/o joint pain, extreme fatigue for the past 10 days . He is drinking plenty of fluids. . Evaluation to date: seen previously . Treatment to date: Amoxicillin with some minimal improvement.       Patient Active Problem List   Diagnosis Code    Asthma, exercise induced J45.990    Allergic rhinitis J30.9    Family history of prostate cancer in father Z80.41       Current Outpatient Medications   Medication Sig Dispense Refill    ibuprofen (MOTRIN) 200 mg tablet Take  by mouth.  benzonatate (TESSALON) 200 mg capsule Take 1 Cap by mouth three (3) times daily as needed for Cough for up to 7 days. 20 Cap 0       Allergies   Allergen Reactions    Nuts [Tree Nut] Swelling    Other Medication Other (comments)     Cashews and banana   Tongue tingles       ROS:   Review of Systems   Constitutional: Positive for malaise/fatigue. Negative for chills and fever. HENT: Negative for congestion, ear pain, sinus pain and sore throat. Respiratory: Positive for cough. Negative for sputum production, shortness of breath and wheezing. Cardiovascular: Negative for chest pain. Musculoskeletal: Positive for joint pain. Neurological: Negative for seizures. Endo/Heme/Allergies: Negative for environmental allergies. Objective:     Visit Vitals  /75   Pulse 72   Temp 98.2 °F (36.8 °C) (Oral)   Resp 18   Ht 5' 10\" (1.778 m)   Wt 163 lb (73.9 kg)   SpO2 98%   BMI 23.39 kg/m²       Vitals and Nurse Documentation reviewed. Physical Exam   Constitutional: No distress. HENT:   Right Ear: Tympanic membrane is not erythematous and not bulging. No middle ear effusion. Left Ear: Tympanic membrane is not erythematous and not bulging. No middle ear effusion. Nose: No rhinorrhea. Right sinus exhibits no maxillary sinus tenderness and no frontal sinus tenderness. Left sinus exhibits no maxillary sinus tenderness and no frontal sinus tenderness. Mouth/Throat: No oropharyngeal exudate or posterior oropharyngeal erythema. Eyes: EOM and lids are normal.   Cardiovascular: S1 normal and S2 normal. Exam reveals no gallop and no friction rub. No murmur heard. Pulmonary/Chest: Breath sounds normal. He has no wheezes. Lymphadenopathy:     He has no cervical adenopathy. Skin: Skin is warm and dry.    Psychiatric: Mood and affect normal.

## 2019-06-24 NOTE — PROGRESS NOTES
Chief Complaint   Patient presents with    Cold Symptoms     cough at night x 12 days    Lethargy     1. Have you been to the ER, urgent care clinic since your last visit? Hospitalized since your last visit? No    2. Have you seen or consulted any other health care providers outside of the [address] [place] [address] since your last visit? Include any pap smears or colon screening.  No

## 2019-06-25 LAB
ALBUMIN SERPL-MCNC: 4.9 G/DL (ref 3.5–5.5)
ALBUMIN/GLOB SERPL: 2.5 {RATIO} (ref 1.2–2.2)
ALP SERPL-CCNC: 57 IU/L (ref 39–117)
ALT SERPL-CCNC: 16 IU/L (ref 0–44)
AST SERPL-CCNC: 19 IU/L (ref 0–40)
BASOPHILS # BLD AUTO: 0 X10E3/UL (ref 0–0.2)
BASOPHILS NFR BLD AUTO: 1 %
BILIRUB SERPL-MCNC: 0.4 MG/DL (ref 0–1.2)
BUN SERPL-MCNC: 14 MG/DL (ref 6–24)
BUN/CREAT SERPL: 13 (ref 9–20)
CALCIUM SERPL-MCNC: 9.6 MG/DL (ref 8.7–10.2)
CHLORIDE SERPL-SCNC: 99 MMOL/L (ref 96–106)
CO2 SERPL-SCNC: 28 MMOL/L (ref 20–29)
CREAT SERPL-MCNC: 1.07 MG/DL (ref 0.76–1.27)
EBV EA IGG SER-ACNC: <9 U/ML (ref 0–8.9)
EBV NA IGG SER IA-ACNC: 189 U/ML (ref 0–17.9)
EBV VCA IGG SER IA-ACNC: >600 U/ML (ref 0–17.9)
EBV VCA IGM SER IA-ACNC: <36 U/ML (ref 0–35.9)
EOSINOPHIL # BLD AUTO: 0.3 X10E3/UL (ref 0–0.4)
EOSINOPHIL NFR BLD AUTO: 5 %
ERYTHROCYTE [DISTWIDTH] IN BLOOD BY AUTOMATED COUNT: 13.8 % (ref 12.3–15.4)
GLOBULIN SER CALC-MCNC: 2 G/DL (ref 1.5–4.5)
GLUCOSE SERPL-MCNC: 71 MG/DL (ref 65–99)
HCT VFR BLD AUTO: 46.2 % (ref 37.5–51)
HGB BLD-MCNC: 15.3 G/DL (ref 13–17.7)
IMM GRANULOCYTES # BLD AUTO: 0 X10E3/UL (ref 0–0.1)
IMM GRANULOCYTES NFR BLD AUTO: 0 %
LYMPHOCYTES # BLD AUTO: 1.3 X10E3/UL (ref 0.7–3.1)
LYMPHOCYTES NFR BLD AUTO: 20 %
MCH RBC QN AUTO: 29.6 PG (ref 26.6–33)
MCHC RBC AUTO-ENTMCNC: 33.1 G/DL (ref 31.5–35.7)
MCV RBC AUTO: 89 FL (ref 79–97)
MONOCYTES # BLD AUTO: 0.5 X10E3/UL (ref 0.1–0.9)
MONOCYTES NFR BLD AUTO: 9 %
NEUTROPHILS # BLD AUTO: 4 X10E3/UL (ref 1.4–7)
NEUTROPHILS NFR BLD AUTO: 65 %
PLATELET # BLD AUTO: 192 X10E3/UL (ref 150–450)
POTASSIUM SERPL-SCNC: 4.8 MMOL/L (ref 3.5–5.2)
PROT SERPL-MCNC: 6.9 G/DL (ref 6–8.5)
RBC # BLD AUTO: 5.17 X10E6/UL (ref 4.14–5.8)
SERVICE CMNT-IMP: ABNORMAL
SODIUM SERPL-SCNC: 140 MMOL/L (ref 134–144)
WBC # BLD AUTO: 6.1 X10E3/UL (ref 3.4–10.8)

## 2019-06-26 ENCOUNTER — TELEPHONE (OUTPATIENT)
Dept: FAMILY MEDICINE CLINIC | Age: 44
End: 2019-06-26

## 2019-06-26 NOTE — TELEPHONE ENCOUNTER
----- Message from Mynor Villalpando sent at 6/26/2019 12:11 PM EDT -----  Regarding: RORY Mason/ Telephone  Contact: [phone]  Pt requesting a return call to discuss his most recent test results that were posted on [employer].

## 2019-06-26 NOTE — TELEPHONE ENCOUNTER
Outbound call to patient. Patient has concerns about lab results and would like the [address]   Numbers explained because some numbers were out of range. Patient states that he is still feeling tired,and still has the sore throat. Will route to provider to advise.

## 2019-06-27 RX ORDER — AZITHROMYCIN 250 MG/1
TABLET, FILM COATED ORAL
Qty: 6 TAB | Refills: 0 | Status: SHIPPED | OUTPATIENT
Start: 2019-06-27 | End: 2019-07-02

## 2019-06-27 RX ORDER — METHYLPREDNISOLONE 4 MG/1
TABLET ORAL
Qty: 1 DOSE PACK | Refills: 0 | Status: SHIPPED | OUTPATIENT
Start: 2019-06-27 | End: 2022-09-26 | Stop reason: ALTCHOICE

## 2019-06-27 NOTE — TELEPHONE ENCOUNTER
Outbound call to patient. Patient explained the note below per RORY Mason. Patient states he was not aware of having mono in the past. Patient is agreeable to Antibiotic and steroid being sent to the pharmacy on file.

## 2019-06-27 NOTE — TELEPHONE ENCOUNTER
The test shows that he has had mono in the past, but it is not currently active. Let's do a round of antibiotics and steroids and see him next week if he is not better. Let me know if he is agreeable.

## 2019-06-27 NOTE — TELEPHONE ENCOUNTER
Mono is typically just a sore throat, so may have not known but this is very common for adults to test positive in the way he tested. Sent prescriptions.

## 2020-05-20 DIAGNOSIS — Z80.42 FAMILY HISTORY OF PROSTATE CANCER: Primary | ICD-10-CM

## 2020-06-22 ENCOUNTER — DOCUMENTATION ONLY (OUTPATIENT)
Dept: ONCOLOGY | Age: 45
End: 2020-06-22

## 2022-03-20 PROBLEM — Z80.42 FAMILY HISTORY OF PROSTATE CANCER IN FATHER: Status: ACTIVE | Noted: 2018-05-10

## 2022-09-25 PROBLEM — M77.11 LATERAL EPICONDYLITIS OF RIGHT ELBOW: Status: ACTIVE | Noted: 2022-09-25

## 2022-09-25 NOTE — PROGRESS NOTES
ASSESSMENT/PLAN:  Below is the assessment and plan developed based on review of pertinent history, physical exam, labs, studies, and medications. 1. Lateral epicondylitis of right elbow  -     XR ELBOW RT AP/LAT; Future      Return in about 6 weeks (around 11/7/2022). In discussion with the patient, we considered the numerus possible diagnoses that could be contributing to their present symptoms. We also deliberated on the extensive management options that must be considered to treat their current condition. We reviewed their accessible prior medical records, diagnostic tests, and current health and employment information. We considered how these symptoms were affecting the patient´s activities of daily living as well as employment and fitness activities. The patient had various questions regarding the possible risks, benefits, complications, morbidity and mortality regarding their diagnosis and treatment options. The patients´ comorbidities were considered, and I advocated that they consider maximizing lifestyle modification through nutrition and exercise to aid in addressing their symptoms. Shared decision making yielded an understanding to move forward with conservation treatment preferences. The patient expressed understanding that if conservative management fails to alleviate the present symptoms they will return to office for re-evaluation and consideration of additional diagnostic tests and potential surgical options. In the interim, we have recommended ice, elevation, and take prescription anti-inflammatory medications along with a physician directed home exercise program. We discussed the risks and common side effects of anti-inflammatory medications and instructed the patient to discontinue the medication and contact us if they experienced any side effects.  The patient was encouraged to discuss the possible side effects with their family physician or pharmacist prior to initiating any new medications. We discussed the fact that many of the recommended treatment options presented are significantly limited by the patient´s social determinants of health. We also reviewed the circumstances surrounding the environment that they live and work which affect a wide range of health risk. We considered the limited access to appropriate educational resources regarding proper nutrition and exercise as well as the economic and social support necessary to maintain health and wellbeing. We discussed the possibility of injection of a steroid mixed with a local anesthetic to relieve pain and decrease inflammation of the right elbow. The risks of a steroid injection which include but are not limited to cartilage damage, death of nearby bone, joint infection, nerve damage, temporary facial flushing, temporary steroid flare of pain and inflammation in the joint, temporary increase in blood sugar, tendon weakening or rupture, thinning of nearby bone (osteoporosis), thinning of skin and soft tissue around the injection site, and whitening or lightening of the skin around the injection site were reviewed at length. We specifically advised that patients with diabetes talk to their primary care to ensure they are safe for a steroid injection due to the transient increase in blood sugar associated with the injection. We discussed the chance of increased bleeding and bruising if the patient is on blood thinners or certain dietary supplements that have a blood-thinning effect. We advised patients that have an active infection, history of allergic reactions to steroids or take medications that may prohibit them from receiving a steroid injection to talk to their primary care physician before receiving and injection. We also talked about limiting the number of injections because these potential side effects increase with larger doses and repeated use.     After confirming the proposed area for injection with the patient, the skin was prepped with alcohol to reduce the chances of infection. The skin was anesthetized with topical ethylene chloride spray and the mixture of two milliliters of Depo-Medrol (40mg/ml), one milliliter of Lidocaine and one milliliter of Marcaine was injected slowly into the medial epicondyle of the right elbow in a sterile fashion without difficulty. The needle was removed and disposed of in a sterile container. The patient tolerated the injection well and a band-aid was placed on the skin. The patient was counseled on protecting the injection area, avoiding water submersion for 2 days, icing for pain relief and looking for signs and symptoms of infection. We requested that the patient contact us if any symptoms persist greater than 48 hours after the injection. We talked about the fact that he should limit weight lifting at this point in an effort to hopefully play golf throughout the season. See him back in 6 weeks time to evaluate his progress. SUBJECTIVE/OBJECTIVE:  Liborio Cedeno (: 1975) is a 52 y.o. male, patient,here for evaluation of the Elbow Pain (Right elbow pain, had completed physical therapy, cold laser, scraping and had seen chiropractor with no relief )  . Patient presents for evaluation for right elbow pain. He states approximately 3 to 4 months ago he noticed the medial aspect of his right elbow became painful. It is exacerbated with forearm supination and exercise. He [place] [facility] [place] [facility] He states this helped slightly however he is still symptomatic daily. PHYSICAL EXAM:    Upon examination of the right elbow, the patient sits with normal posture. They are tender to palpation over the medial epicondyle and flexor origin. The patient has full range of motion, but discomfort with resisted wrist flexion. They have 5/5 strength, and are neurovascularly intact distally.  There is no erythema, warmth or skin lesions present. IMAGING:    X-rays performed today in the office 2 views of the right elbow demonstrate no obvious fractures or dislocations. Allergies   Allergen Reactions    Nuts [Tree Nut] Swelling    Other Medication Other (comments)     Cashews and banana   Tongue tingles       Current Outpatient Medications   Medication Sig    ibuprofen (MOTRIN) 200 mg tablet Take  by mouth.    methylPREDNISolone (MEDROL DOSEPACK) 4 mg tablet UAD (Patient not taking: Reported on 9/26/2022)     No current facility-administered medications for this visit. Past Medical History:   Diagnosis Date    Acne vulgaris     acne treated with Accutane    Asthma     exercise induced    Hemorrhoids     Nosebleed     frequent       Past Surgical History:   Procedure Laterality Date    HX KNEE ARTHROSCOPY  12/11    right partial menisctomy, cyclops lesion    HX KNEE ARTHROSCOPY  7/11    right ACL, meniscus repair       Family History   Problem Relation Age of Onset    Hypertension Mother     Cancer Father 61        prostate, mets to spine    Prostate Cancer Brother 50    Cancer Paternal Aunt         breast CA 45s    Diabetes Paternal Grandfather     Hypertension Brother 43    Thyroid Disease Brother 36       Social History     Socioeconomic History    Marital status:      Spouse name: [facility]    Number of children: 2    Years of education: Not on file    Highest education level: Not on file   Occupational History    Occupation: Sales     Employer: [employer]   Tobacco Use    Smoking status: Never    Smokeless tobacco: Never   Substance and Sexual Activity    Alcohol use:  Yes     Alcohol/week: 6.7 standard drinks     Types: 8 Cans of beer per week    Drug use: No    Sexual activity: Yes     Partners: Female     Birth control/protection: None   Other Topics Concern     Service Not Asked    Blood Transfusions Not Asked    Caffeine Concern Not Asked    Occupational Exposure Not Asked    Hobby Hazards Not Asked Sleep Concern Not Asked    Stress Concern Not Asked    Weight Concern Not Asked    Special Diet Not Asked    Back Care Not Asked    Exercise Not Asked     Comment:  golfs once a week, weights 3 times per week. Bike Helmet Not Asked    Seat Belt Not Asked    Self-Exams Not Asked   Social History Narrative    Not on file     Social Determinants of Health     Financial Resource Strain: Not on file   Food Insecurity: Not on file   Transportation Needs: Not on file   Physical Activity: Not on file   Stress: Not on file   Social Connections: Not on file   Intimate Partner Violence: Not on file   Housing Stability: Not on file       Review of Systems    No flowsheet data found. Vitals:  Ht 5' 11\" (1.803 m)   Wt 165 lb (74.8 kg)   BMI 23.01 kg/m²    Body mass index is 23.01 kg/m². ROS    Positive for: Musculoskeletal  Last edited by Bj Noel on 9/26/2022  8:42 AM.            An electronic signature was used to authenticate this note.   -- Buffy Portillo MD

## 2022-09-26 ENCOUNTER — OFFICE VISIT (OUTPATIENT)
Dept: ORTHOPEDIC SURGERY | Age: 47
End: 2022-09-26
Payer: COMMERCIAL

## 2022-09-26 VITALS — WEIGHT: 165 LBS | BODY MASS INDEX: 23.1 KG/M2 | HEIGHT: 71 IN

## 2022-09-26 DIAGNOSIS — M77.01 MEDIAL EPICONDYLITIS OF ELBOW, RIGHT: ICD-10-CM

## 2022-09-26 DIAGNOSIS — M77.11 LATERAL EPICONDYLITIS OF RIGHT ELBOW: Primary | ICD-10-CM

## 2022-09-26 PROCEDURE — 99204 OFFICE O/P NEW MOD 45 MIN: CPT | Performed by: ORTHOPAEDIC SURGERY

## 2022-09-26 PROCEDURE — 20605 DRAIN/INJ JOINT/BURSA W/O US: CPT | Performed by: ORTHOPAEDIC SURGERY

## 2022-09-26 RX ORDER — BUPIVACAINE HYDROCHLORIDE 5 MG/ML
2 INJECTION, SOLUTION EPIDURAL; INTRACAUDAL ONCE
Status: COMPLETED | OUTPATIENT
Start: 2022-09-26 | End: 2022-09-26

## 2022-09-26 RX ORDER — TRIAMCINOLONE ACETONIDE 40 MG/ML
40 INJECTION, SUSPENSION INTRA-ARTICULAR; INTRAMUSCULAR ONCE
Status: COMPLETED | OUTPATIENT
Start: 2022-09-26 | End: 2022-09-26

## 2022-09-26 RX ADMIN — TRIAMCINOLONE ACETONIDE 40 MG: 40 INJECTION, SUSPENSION INTRA-ARTICULAR; INTRAMUSCULAR at 09:38

## 2022-09-26 RX ADMIN — BUPIVACAINE HYDROCHLORIDE 10 MG: 5 INJECTION, SOLUTION EPIDURAL; INTRACAUDAL at 09:37

## 2023-01-25 ENCOUNTER — OFFICE VISIT (OUTPATIENT)
Dept: ORTHOPEDIC SURGERY | Age: 48
End: 2023-01-25
Payer: COMMERCIAL

## 2023-01-25 VITALS — BODY MASS INDEX: 22.82 KG/M2 | WEIGHT: 163 LBS | HEIGHT: 71 IN

## 2023-01-25 DIAGNOSIS — S56.912A: Primary | ICD-10-CM

## 2023-01-25 DIAGNOSIS — M77.01 MEDIAL EPICONDYLITIS OF ELBOW, RIGHT: ICD-10-CM

## 2023-01-25 DIAGNOSIS — M25.522 LEFT ELBOW PAIN: ICD-10-CM

## 2023-01-25 PROCEDURE — 99214 OFFICE O/P EST MOD 30 MIN: CPT | Performed by: ORTHOPAEDIC SURGERY

## 2023-01-25 RX ORDER — MELOXICAM 7.5 MG/1
7.5 TABLET ORAL DAILY
Qty: 30 TABLET | Refills: 3 | Status: SHIPPED | OUTPATIENT
Start: 2023-01-25

## 2023-01-25 RX ORDER — ALBUTEROL SULFATE 90 UG/1
AEROSOL, METERED RESPIRATORY (INHALATION)
COMMUNITY

## 2023-01-25 RX ORDER — METHYLPREDNISOLONE 4 MG/1
4 TABLET ORAL
Qty: 1 DOSE PACK | Refills: 1 | Status: SHIPPED | OUTPATIENT
Start: 2023-01-25

## 2023-01-25 NOTE — PROGRESS NOTES
ASSESSMENT/PLAN:  Below is the assessment and plan developed based on review of pertinent history, physical exam, labs, studies, and medications. 1. Lateral epicondylitis of right elbow  -     XR ELBOW RT AP/LAT; Future      Return in about 6 weeks (around 11/7/2022). In discussion with the patient, we considered the numerus possible diagnoses that could be contributing to their present symptoms. We also deliberated on the extensive management options that must be considered to treat their current condition. We reviewed their accessible prior medical records, diagnostic tests, and current health and employment information. We considered how these symptoms were affecting the patient´s activities of daily living as well as employment and fitness activities. The patient had various questions regarding the possible risks, benefits, complications, morbidity and mortality regarding their diagnosis and treatment options. The patients´ comorbidities were considered, and I advocated that they consider maximizing lifestyle modification through nutrition and exercise to aid in addressing their symptoms. Shared decision making yielded an understanding to move forward with conservation treatment preferences. The patient expressed understanding that if conservative management fails to alleviate the present symptoms they will return to office for re-evaluation and consideration of additional diagnostic tests and potential surgical options. In the interim, we have recommended ice, elevation, and take prescription anti-inflammatory medications along with a physician directed home exercise program. We discussed the risks and common side effects of anti-inflammatory medications and instructed the patient to discontinue the medication and contact us if they experienced any side effects.  The patient was encouraged to discuss the possible side effects with their family physician or pharmacist prior to initiating any new medications. We discussed the fact that many of the recommended treatment options presented are significantly limited by the patient´s social determinants of health. We also reviewed the circumstances surrounding the environment that they live and work which affect a wide range of health risk. We considered the limited access to appropriate educational resources regarding proper nutrition and exercise as well as the economic and social support necessary to maintain health and wellbeing. We talked about the fact that he did have a slight recurrence of his medial epicondylitis on the right elbow however is not nearly as bad as it was prior when he required an injection. In regards to his left elbow he sustained a strain injury to his supinator on the left elbow. We will write him a prescription for Medrol Dosepak as well as meloxicam to help diminish the inflammation that he is got going on. He will continue to modify his activities in the weight room to accommodate. He was given home exercise program for medial lateral epicondylitis placed into his chart. SUBJECTIVE/OBJECTIVE:  Tigist Cedeno (: 1975) is a 52 y.o. male, patient,here for evaluation of the Elbow Pain (Right elbow pain, had completed physical therapy, cold laser, scraping and had seen chiropractor with no relief )  . Patient presents for evaluation for right elbow pain. He states approximately 3 to 4 months ago he noticed the medial aspect of his right elbow became painful. He underwent a corticosteroid injection and this resolved. He has had a slight increase in his pain over the last few weeks due to increased weight lifting however is not bothering him nearly the point that it did prior. He has had significant pain in his left elbow however for the past 2 weeks following a very heavy upper body workout which included supinated bicep curls. Notices pain right along the border of the ulna.   Notices pain with supination of the forearm. Has been trying anti-inflammatories without any significant relief. Denies any numbness or tingling in either extremity. PHYSICAL EXAM:    Upon examination of the right elbow, the patient sits with normal posture. They are tender to palpation over the medial epicondyle and flexor origin. The patient has full range of motion, but discomfort with resisted wrist flexion. They have 5/5 strength, and are neurovascularly intact distally. There is no erythema, warmth or skin lesions present. Upon examination of the left elbow, the patient sits with normal posture. They are tender to palpation over the origin of the supinator. He has full range of motion but significant discomfort with supination of the forearm court. He has severe reproduction of his symptoms with resisted supination of the forearm. There is no erythema, warmth or skin lesions present. IMAGING:    X-rays performed today in the office 2 views of the left elbow demonstrate no obvious fractures or dislocations. Allergies   Allergen Reactions    Nuts [Tree Nut] Swelling    Other Medication Other (comments)     Cashews and banana   Tongue tingles       Current Outpatient Medications   Medication Sig    ibuprofen (MOTRIN) 200 mg tablet Take  by mouth.    methylPREDNISolone (MEDROL DOSEPACK) 4 mg tablet UAD (Patient not taking: Reported on 9/26/2022)     No current facility-administered medications for this visit.        Past Medical History:   Diagnosis Date    Acne vulgaris     acne treated with Accutane    Asthma     exercise induced    Hemorrhoids     Nosebleed     frequent       Past Surgical History:   Procedure Laterality Date    HX KNEE ARTHROSCOPY  12/11    right partial menisctomy, cyclops lesion    HX KNEE ARTHROSCOPY  7/11    right ACL, meniscus repair       Family History   Problem Relation Age of Onset    Hypertension Mother     Cancer Father 61        prostate, mets to spine    Prostate Cancer Brother 50    Cancer Paternal Aunt         breast CA 45s    Diabetes Paternal Grandfather     Hypertension Brother 43    Thyroid Disease Brother 36       Social History     Socioeconomic History    Marital status:      Spouse name: Charisse Osman    Number of children: 2    Years of education: Not on file    Highest education level: Not on file   Occupational History    Occupation: Sales     Employer: [address] [place], [place] [address] [address]   Tobacco Use    Smoking status: Never    Smokeless tobacco: Never   Substance and Sexual Activity    Alcohol use: Yes     Alcohol/week: 6.7 standard drinks     Types: 8 Cans of beer per week    Drug use: No    Sexual activity: Yes     Partners: Female     Birth control/protection: None   Other Topics Concern     Service Not Asked    Blood Transfusions Not Asked    Caffeine Concern Not Asked    Occupational Exposure Not Asked    [address] Hazards Not Asked    Sleep Concern Not Asked    Stress Concern Not Asked    Weight Concern Not Asked    Special Diet Not Asked    Back Care Not Asked    Exercise Not Asked     Comment:  golfs once a week, weights 3 times per week. Bike Helmet Not Asked    Seat Belt Not Asked    Self-Exams Not Asked   Social History Narrative    Not on file     Social Determinants of Health     Financial Resource Strain: Not on file   Food Insecurity: Not on file   Transportation Needs: Not on file   Physical Activity: Not on file   Stress: Not on file   Social Connections: Not on file   Intimate Partner Violence: Not on file   Housing Stability: Not on file       Review of Systems    No flowsheet data found. Vitals:  Ht 5' 11\" (1.803 m)   Wt 165 lb (74.8 kg)   BMI 23.01 kg/m²    Body mass index is 23.01 kg/m². ROS    Positive for: Musculoskeletal  Last edited by Randall Lu on 9/26/2022  8:42 AM.            An electronic signature was used to authenticate this note.   -- Dann Padron MD

## 2023-02-03 ENCOUNTER — PATIENT MESSAGE (OUTPATIENT)
Dept: ORTHOPEDIC SURGERY | Age: 48
End: 2023-02-03

## 2023-02-03 DIAGNOSIS — S56.912A: ICD-10-CM

## 2023-02-03 DIAGNOSIS — M77.11 LATERAL EPICONDYLITIS OF RIGHT ELBOW: ICD-10-CM

## 2023-02-03 DIAGNOSIS — M77.01 MEDIAL EPICONDYLITIS OF ELBOW, RIGHT: Primary | ICD-10-CM

## 2023-02-06 NOTE — TELEPHONE ENCOUNTER
From: Cary Cedeno  To: Cherry Thorpe MD  Sent: 2/3/2023 3:30 PM EST  Subject: elbow pain    So, I finished the steroid pack a couple of days ago. It didn't seem to help at all. Yesterday was still the acute pain in the left elbow. The right elbow is starting to return to constant soreness (not workout sore).